# Patient Record
Sex: MALE | Race: OTHER
[De-identification: names, ages, dates, MRNs, and addresses within clinical notes are randomized per-mention and may not be internally consistent; named-entity substitution may affect disease eponyms.]

---

## 2020-06-15 ENCOUNTER — HOSPITAL ENCOUNTER (EMERGENCY)
Dept: HOSPITAL 61 - ER | Age: 50
Discharge: HOME | End: 2020-06-15
Payer: SELF-PAY

## 2020-06-15 VITALS — BODY MASS INDEX: 30.05 KG/M2 | HEIGHT: 65 IN | WEIGHT: 180.34 LBS

## 2020-06-15 VITALS — SYSTOLIC BLOOD PRESSURE: 185 MMHG | DIASTOLIC BLOOD PRESSURE: 85 MMHG

## 2020-06-15 DIAGNOSIS — R06.02: ICD-10-CM

## 2020-06-15 DIAGNOSIS — R05: ICD-10-CM

## 2020-06-15 DIAGNOSIS — Z20.828: ICD-10-CM

## 2020-06-15 DIAGNOSIS — J18.9: Primary | ICD-10-CM

## 2020-06-15 LAB
ALBUMIN SERPL-MCNC: 2.9 G/DL (ref 3.4–5)
ALBUMIN/GLOB SERPL: 0.6 {RATIO} (ref 1–1.7)
ALP SERPL-CCNC: 186 U/L (ref 46–116)
ALT SERPL-CCNC: 61 U/L (ref 16–63)
ANION GAP SERPL CALC-SCNC: 11 MMOL/L (ref 6–14)
APTT PPP: YELLOW S
AST SERPL-CCNC: 60 U/L (ref 15–37)
BACTERIA #/AREA URNS HPF: 0 /HPF
BASOPHILS # BLD AUTO: 0 X10^3/UL (ref 0–0.2)
BASOPHILS NFR BLD: 1 % (ref 0–3)
BILIRUB SERPL-MCNC: 0.3 MG/DL (ref 0.2–1)
BILIRUB UR QL STRIP: NEGATIVE
BUN SERPL-MCNC: 13 MG/DL (ref 8–26)
BUN/CREAT SERPL: 13 (ref 6–20)
CALCIUM SERPL-MCNC: 8.4 MG/DL (ref 8.5–10.1)
CHLORIDE SERPL-SCNC: 99 MMOL/L (ref 98–107)
CO2 SERPL-SCNC: 25 MMOL/L (ref 21–32)
CREAT SERPL-MCNC: 1 MG/DL (ref 0.7–1.3)
EOSINOPHIL NFR BLD: 0 % (ref 0–3)
EOSINOPHIL NFR BLD: 0 X10^3/UL (ref 0–0.7)
ERYTHROCYTE [DISTWIDTH] IN BLOOD BY AUTOMATED COUNT: 14.8 % (ref 11.5–14.5)
FIBRINOGEN PPP-MCNC: CLEAR MG/DL
GFR SERPLBLD BASED ON 1.73 SQ M-ARVRAT: 80.1 ML/MIN
GLOBULIN SER-MCNC: 5.1 G/DL (ref 2.2–3.8)
GLUCOSE SERPL-MCNC: 130 MG/DL (ref 70–99)
HCT VFR BLD CALC: 42.4 % (ref 39–53)
HGB BLD-MCNC: 14.5 G/DL (ref 13–17.5)
INFLUENZA A PATIENT: NEGATIVE
INFLUENZA B PATIENT: NEGATIVE
LYMPHOCYTES # BLD: 0.9 X10^3/UL (ref 1–4.8)
LYMPHOCYTES NFR BLD AUTO: 12 % (ref 24–48)
MCH RBC QN AUTO: 29 PG (ref 25–35)
MCHC RBC AUTO-ENTMCNC: 34 G/DL (ref 31–37)
MCV RBC AUTO: 84 FL (ref 79–100)
MONO #: 0.2 X10^3/UL (ref 0–1.1)
MONOCYTES NFR BLD: 3 % (ref 0–9)
NEUT #: 6.5 X10^3/UL (ref 1.8–7.7)
NEUTROPHILS NFR BLD AUTO: 84 % (ref 31–73)
NITRITE UR QL STRIP: NEGATIVE
PH UR STRIP: 6.5 [PH]
PLATELET # BLD AUTO: 134 X10^3/UL (ref 140–400)
POTASSIUM SERPL-SCNC: 3.5 MMOL/L (ref 3.5–5.1)
PROT SERPL-MCNC: 8 G/DL (ref 6.4–8.2)
PROT UR STRIP-MCNC: 30 MG/DL
RBC # BLD AUTO: 5.05 X10^6/UL (ref 4.3–5.7)
RBC #/AREA URNS HPF: 0 /HPF (ref 0–2)
SODIUM SERPL-SCNC: 135 MMOL/L (ref 136–145)
SQUAMOUS #/AREA URNS LPF: (no result) /LPF
UROBILINOGEN UR-MCNC: 0.2 MG/DL
WBC # BLD AUTO: 7.6 X10^3/UL (ref 4–11)
WBC #/AREA URNS HPF: (no result) /HPF (ref 0–4)

## 2020-06-15 PROCEDURE — 93005 ELECTROCARDIOGRAM TRACING: CPT

## 2020-06-15 PROCEDURE — 83605 ASSAY OF LACTIC ACID: CPT

## 2020-06-15 PROCEDURE — 96374 THER/PROPH/DIAG INJ IV PUSH: CPT

## 2020-06-15 PROCEDURE — 85025 COMPLETE CBC W/AUTO DIFF WBC: CPT

## 2020-06-15 PROCEDURE — 87804 INFLUENZA ASSAY W/OPTIC: CPT

## 2020-06-15 PROCEDURE — 71045 X-RAY EXAM CHEST 1 VIEW: CPT

## 2020-06-15 PROCEDURE — 36415 COLL VENOUS BLD VENIPUNCTURE: CPT

## 2020-06-15 PROCEDURE — 87040 BLOOD CULTURE FOR BACTERIA: CPT

## 2020-06-15 PROCEDURE — 80053 COMPREHEN METABOLIC PANEL: CPT

## 2020-06-15 PROCEDURE — 87070 CULTURE OTHR SPECIMN AEROBIC: CPT

## 2020-06-15 PROCEDURE — 81001 URINALYSIS AUTO W/SCOPE: CPT

## 2020-06-15 PROCEDURE — 99285 EMERGENCY DEPT VISIT HI MDM: CPT

## 2020-06-15 PROCEDURE — 87880 STREP A ASSAY W/OPTIC: CPT

## 2020-06-15 NOTE — RAD
AP portable chest  radiograph 6/15/2020

 

Clinical History: Fever and cough.

 

An AP erect portable digital radiograph of the chest was obtained.

 

The cardiac silhouette is mildly enlarged. The thoracic aorta is tortuous.

Bilateral perihilar infiltrates are seen. No pneumothorax or pleural 

effusion is noted. Degenerative changes are seen involving the thoracic 

spine.

 

IMPRESSION: Bilateral perihilar infiltrates are seen.

 

Electronically signed by: Vinnie Salazar MD (6/15/2020 10:10 PM) [id number]

## 2020-06-15 NOTE — PHYS DOC
Past Medical History


Past Medical History:  No Pertinent History


Past Surgical History:  No Surgical History


Smoking Status:  Never Smoker


Alcohol Use:  None





General Adult


EDM:


Chief Complaint:  FEVER





HPI:


HPI:





Patient is a 49  year old male who presents with complaint of 3-day history of 

cough, sore throat, fever and some mild shortness of breath.  Patient indicates 

that he had been around a friend who was diagnosed with COVID-19.  Patient 

states that he is just not feeling well.  He states the shortness of breath is w

orsened with exertion.  He denies any chest pain.  He denies any abdominal pain,

vomiting or diarrhea.  Patient states that fevers been as high as 103.  []





Review of Systems:


Review of Systems:


Constitutional: Complains of fever and chills. []


Respiratory: Complains of cough and shortness of breath. [] 


Cardiovascular:  Denies chest pain or edema. [] 


GI:  Denies abdominal pain, nausea, vomiting or diarrhea. [] 


Integument:  Denies rash. [] 


Neurologic:  Denies headache, focal weakness or sensory changes. [] 





A full 10 point review of systems has been reviewed and is otherwise negative.





Heart Score:


Risk Factors:


Risk Factors:  DM, Current or recent (<one month) smoker, HTN, HLP, family 

history of CAD, obesity.


Risk Scores:


Score 0 - 3:  2.5% MACE over next 6 weeks - Discharge Home


Score 4 - 6:  20.3% MACE over next 6 weeks - Admit for Clinical Observation


Score 7 - 10:  72.7% MACE over next 6 weeks - Early Invasive Strategies





Current Medications:





Current Medications








 Medications


  (Trade)  Dose


 Ordered  [facility]  Start Time


 Stop Time Status Last Admin


Dose Admin


 


 Acetaminophen


  (Tylenol)  1,000 mg  1X  ONCE  6/15/20 21:15


 6/15/20 21:16 DC 6/15/20 21:15


1,000 MG


 


 Sodium Chloride  1,000 ml @ 


 1,000 mls/hr  Q1H  6/15/20 21:15


 6/15/20 22:14 DC 6/15/20 21:15


1,000 MLS/HR











Allergies:


Allergies:





Allergies








Coded Allergies Type Severity Reaction Last Updated Verified


 


  No Known Drug Allergies    6/15/20 No











Physical Exam:


PE:





Constitutional: Well developed, well nourished, no acute distress, non-toxic 

appearance. []


HENT: Normocephalic, atraumatic, bilateral external ears normal, oropharynx 

moist, no oral exudates, nose normal. []


Eyes: PERRLA, EOMI, conjunctiva normal, no discharge. [] 


Neck: Normal range of motion, no tenderness, supple, no stridor. [] 


Cardiovascular: Regular rate and rhythm []


Lungs & Thorax:  Bilateral breath sounds clear to auscultation []


Abdomen: Bowel sounds normal, soft, no tenderness. [] 


Skin: Warm, dry, no erythema, no rash. [] 


Extremities: No tenderness, no cyanosis, no clubbing, ROM intact. [] 


Neurologic: Alert and oriented X 3, no focal deficits noted. []





Current Patient Data:


Labs:





                                Laboratory Tests








Test


 6/15/20


21:25


 


White Blood Count


 7.6 x10^3/uL


(4.0-11.0)


 


Red Blood Count


 5.05 x10^6/uL


(4.30-5.70)


 


Hemoglobin


 14.5 g/dL


(13.0-17.5)


 


Hematocrit


 42.4 %


(39.0-53.0)


 


Mean Corpuscular Volume


 84 fL ()





 


Mean Corpuscular Hemoglobin 29 pg (25-35)  


 


Mean Corpuscular Hemoglobin


Concent 34 g/dL


(31-37)


 


Red Cell Distribution Width


 14.8 %


(11.5-14.5)  H


 


Platelet Count


 134 x10^3/uL


(140-400)  L


 


Neutrophils (%) (Auto) 84 % (31-73)  H


 


Lymphocytes (%) (Auto) 12 % (24-48)  L


 


Monocytes (%) (Auto) 3 % (0-9)  


 


Eosinophils (%) (Auto) 0 % (0-3)  


 


Basophils (%) (Auto) 1 % (0-3)  


 


Neutrophils # (Auto)


 6.5 x10^3/uL


(1.8-7.7)


 


Lymphocytes # (Auto)


 0.9 x10^3/uL


(1.0-4.8)  L


 


Monocytes # (Auto)


 0.2 x10^3/uL


(0.0-1.1)


 


Eosinophils # (Auto)


 0.0 x10^3/uL


(0.0-0.7)


 


Basophils # (Auto)


 0.0 x10^3/uL


(0.0-0.2)


 


Sodium Level


 135 mmol/L


(136-145)  L


 


Potassium Level


 3.5 mmol/L


(3.5-5.1)


 


Chloride Level


 99 mmol/L


()


 


Carbon Dioxide Level


 25 mmol/L


(21-32)


 


Anion Gap 11 (6-14)  


 


Blood Urea Nitrogen


 13 mg/dL


(8-26)


 


Creatinine


 1.0 mg/dL


(0.7-1.3)


 


Estimated GFR


(Cockcroft-Gault) 80.1  





 


BUN/Creatinine Ratio 13 (6-20)  


 


Glucose Level


 130 mg/dL


(70-99)  H


 


Lactic Acid Level


 1.3 mmol/L


(0.4-2.0)


 


Calcium Level


 8.4 mg/dL


(8.5-10.1)  L


 


Total Bilirubin


 0.3 mg/dL


(0.2-1.0)


 


Aspartate Amino Transferase


(AST) 60 U/L (15-37)


H


 


Alanine Aminotransferase (ALT)


 61 U/L (16-63)





 


Alkaline Phosphatase


 186 U/L


()  H


 


Total Protein


 8.0 g/dL


(6.4-8.2)


 


Albumin


 2.9 g/dL


(3.4-5.0)  L


 


Albumin/Globulin Ratio


 0.6 (1.0-1.7)


L


 


Influenza Type A Antigen


 Negative


(NEGATIVE)


 


Influenza Type B Antigen


 Negative


(NEGATIVE)





                                Laboratory Tests


6/15/20 21:25








                                Laboratory Tests


6/15/20 21:25








Vital Signs:





                                   Vital Signs








  Date Time  Temp Pulse Resp B/P (MAP) Pulse Ox O2 Delivery O2 Flow Rate FiO2


 


6/15/20 21:06 101.1 97 16 217/105 (142) 97 Room Air  





 101.1       











EKG:


EKG:


[]





Radiology/Procedures:


Radiology/Procedures:


[]


Impression:


PROCEDURE: PORTABLE CHEST 1V





 


AP portable chest  radiograph 6/15/2020


 


Clinical History: Fever and cough.


 


An AP erect portable digital radiograph of the chest was obtained.


 


The cardiac silhouette is mildly enlarged. The thoracic aorta is tortuous.


Bilateral perihilar infiltrates are seen. No pneumothorax or pleural 


effusion is noted. Degenerative changes are seen involving the thoracic 


spine.


 


IMPRESSION: Bilateral perihilar infiltrates are seen.


 


Electronically signed by: Vinnie Salazar MD (6/15/2020 10:10 PM) [id number]











Course & Med Decision Making:


Course & Med Decision Making


Pertinent Labs and Imaging studies reviewed. (See chart for details)





[]





Dragon Disclaimer:


Dragon Disclaimer:


This electronic medical record was generated, in whole or in part, using a voice

 recognition dictation system.





Departure


Departure


Impression:  


   Primary Impression:  


   Pneumonia


   Qualified Codes:  J18.9 - Pneumonia, unspecified organism


   Additional Impression:  


   Suspected COVID-19 virus infection


Disposition:  01 HOME, SELF-CARE


Condition:  STABLE


Referrals:  


NO PCP (PCP)


Patient Instructions:  Pneumonia, Adult





Additional Instructions:  


Definicin


Se le realiz la prueba de deteccin del COVID-19 o se le diagnostic dicha 

enfermedad. Es 


jairo infeccin ocasionada por un nuevo tipo de coronavirus. En la mayora de los 

casos, el 


COVID-19 provoca sntomas similares a los del resfriado. En algunas personas, 

puede 


ocasionar sntomas ms graves, alida problemas respiratorios.





No existe un tratamiento para el virus COVID-19. El cuerpo elimina la infeccin 

con el 


tiempo. El cuidado personal ayuda a aliviar el malestar.





Pasos que debe seguir


1. Cuidados personales


Descanse cuando sea necesario. Los hbitos saludables pueden ayudarlo a sentirse

 mejor. 


Algunas medidas para lograr cambios incluyen lo siguiente:





 - Elija alimentos saludables, alida frutas y verduras. Janee abundante cantidad 

de agua 


jailene todo el da.


 - Duerma bhavana por la noche.


 - Si fuma, intente no hacerlo. [place] ayudar a mejorar la respiracin.


 - Evite el alcohol.


2.  Mantenga sanos a los dems


El virus puede contagiarse a otras personas. Cada vez que estornuda o tose, se 

liberan 


gotitas. Las gotitas pueden entrar en la boca, la nariz o los ojos de las 

personas que se 


encuentran cerca de usted y ocasionar la infeccin. Para reducir las 

probabilidades de 


contagiar el virus COVID-19 a otros, tenga en cuenta lo siguiente:





 - Qudese en casa el tiempo que el mdico se lo indique. Es posible que deba 

quedarse en 


casa hasta que la enfermedad desaparezca. Salga nicamente para recibir atencin

 mdica o en 


elroy de urgencia.


 - Evite las reas pblicas, los eventos o el transporte pblico. No reanude las

 actividades 


laborales o escolares hasta que el mdico lo autorice.


 - Llame previamente si necesita asistir a un centro mdico. Avise que es 

posible que haya 


contrado COVID-19. [place] ayudar a que le indiquen adonde debe dirigirse. 

Jewell pueden 


pedirle que use jairo mscara facial cuando vaya al consultorio. Si llama a los 

servicios de 


asistencia mdica de urgencias, avseles que es posible que haya contrado 

COVID-19.





Mientras est en casa:





 -  Evite el contacto directo con otras personas. Mantngase a jairo distancia 

aproximada de 2 


metros. Si es posible, pasen la mayor parte del tiempo en douglas separadas.


 - Use jairo mscara facial si estar en contacto directo con otras personas, por 

ejemplo, si 


compartir jairo habitacin o un vehculo.


 - Pida a alguien que limpie las superficies comunes de la casa. Limpie 

picaportes, mesadas 


y lavamanos con limpiadores domsticos todos los allan.


 - Al toser o estornudar, cbrase con un pauelo de papel. Despus de usarlo, 

deschelo de 


inmediato. Si no tiene un pauelo de papel, tosa o estornude en el pliegue del 

codo.


 - Lvese las mercy con frecuencia. Lvese las mercy despus de estornudar o 

toser. Lvese 


con agua y jabn jailene, al menos, 20 segundos. Si no dispone de agua y jabn, 

use un 


limpiador de mercy a base de alcohol.


 - No cocine para otros. Evite compartir objetos personales, alida tenedores, 

cucharas o 


cepillos de dientes.


 - Mientras est enfermo, evite el contacto directo con las mascotas. No hay 

indicios de si 


el virus se transmite a las mascotas. Esta es jairo medida de seguridad que debe 

tenerse en 


cuenta hasta que se sepa ms acerca de rina virus.


El aislamiento puede ser frustrante. La interaccin social puede ayudar. 

Mantngase en 


contacto con amigos y familiares por telfono u otros medios tecnolgicos. Puede

 interactuar 


con otras personas en el hogar, sumi mantenga jairo distancia grigsby de 

aproximadamente 2 


metros.





Seguimiento


Las pruebas para confirmar la presencia del COVID-19 pueden demorar algunos 

allan. Es posible 


que deba seguir los pasos mencionados anteriormente hasta que estn los 

resultados de las 


pruebas. Lo llamarn del consultorio mdico para saber si ha habido algn cambio

 en gates 


maryam. Tambin le avisarn cuando pueda volver a estar cerca de otras personas.





Problemas a los que debe estar atento


Comunquese con el mdico si no se recupera segn lo previsto o si tiene 

problemas alida los 


siguientes:





 - Dificultad para respirar


 - Dolor de pecho


 - Empeoramiento de los sntomas


Si carter que tiene jairo urgencia, llame a los servicios de asistencia mdica de 

urgencias de 


inmediato.





As taken from [employer]


Scripts


Azithromycin (ZITHROMAX) 250 Mg Tablet


1 PKG PO UD, #6 TAB


   Prov: HENRY FARLEY Jr. DO         6/15/20 


Amoxicillin/Potassium Clav (AUGMENTIN 875-125 TABLET) 1 Each Tablet


1 TAB PO BID for 10 Days, #20 TAB 0 Refills


   Prov: HENRY FARLEY Jr. DO         6/15/20





Justicifation of Admission Dx:


Justifications for Admission:


Justification of Admission Dx:  Comment: (Not applicable)











HENRY FARLEY Jr. DO          Emanuel 15, 2020 22:31

## 2020-06-16 NOTE — EKG
[facility]

              [address] [place] [address]

Test Date:    2020-06-15               Test Time:    21:13:33

Pat Name:     SARAHI MUKHERJEE               Department:   

Patient ID:   [id number]           Room:          

Gender:       M                        Technician:   

:          [id number]               Requested By: HENRY FARLEY

Order Number: [id number]           Reading MD:   Kevin Watson MD

                                 Measurements

Intervals                              Axis          

Rate:         96                       P:            38

SD:           140                      QRS:          48

QRSD:         88                       T:            59

QT:           328                                    

QTc:          421                                    

                           Interpretive Statements

SINUS RHYTHM

NON-SPECIFIC ST/T CHANGES

Electronically Signed On 2020 9:49:13 CDT by Kevin Watson MD